# Patient Record
Sex: MALE | Race: WHITE | NOT HISPANIC OR LATINO | URBAN - METROPOLITAN AREA
[De-identification: names, ages, dates, MRNs, and addresses within clinical notes are randomized per-mention and may not be internally consistent; named-entity substitution may affect disease eponyms.]

---

## 2018-04-04 ENCOUNTER — EVALUATION (OUTPATIENT)
Dept: PHYSICAL THERAPY | Facility: CLINIC | Age: 58
End: 2018-04-04
Payer: COMMERCIAL

## 2018-04-04 DIAGNOSIS — M25.511 ACUTE PAIN OF RIGHT SHOULDER: Primary | ICD-10-CM

## 2018-04-04 PROCEDURE — G8984 CARRY CURRENT STATUS: HCPCS | Performed by: PHYSICAL THERAPIST

## 2018-04-04 PROCEDURE — G8985 CARRY GOAL STATUS: HCPCS | Performed by: PHYSICAL THERAPIST

## 2018-04-04 PROCEDURE — 97161 PT EVAL LOW COMPLEX 20 MIN: CPT | Performed by: PHYSICAL THERAPIST

## 2018-04-04 NOTE — PROGRESS NOTES
PT Evaluation     Today's date: 2018  Patient name: Theresa Corirgan  :   MRN: [id number]  Referring provider: Wilder Alvarez MD  Dx:   Encounter Diagnosis     ICD-10-CM    1  Acute pain of right shoulder M25 511        Start Time: 940  Stop Time:   Total time in clinic (min): 50 minutes    Assessment  Impairments: abnormal coordination, abnormal or restricted ROM, activity intolerance, lacks appropriate home exercise program and pain with function    Assessment details: Pt presents w/ indicators of R shldr complex malalignment w/ mod serratus anterior weakness, winging of scapula, painful arc into OH positions, and ttp within bicipital groove and near supraspinatus insertion  Also ttp noted within teres musculature  ROM is near [facility] however PN at end range and painful arc noted  Mild impingement is suggested w/ + cross over sign and palpation  Skilled PT is advised to address limitations and promote optimal activity potential and posturing PN and restriction free  Understanding of Dx/Px/POC: good   Prognosis: good    Goals  ST  Decrease PN <3/10 w/ all activity within 2 wks  2  Increase strength R shldr + 1 grade within 2 wks  3  Improve posturing and periscapular muscle activation within 2 wks  LT  I in HEP within 5 wks  2  Strength 5/5 t/o R shldr within 5 wks  3  ROM painfree in all planes WNL's within 5 wks  4   Return to all functional activity PN free within 5 wks    Plan  Patient would benefit from: skilled PT  Planned modality interventions: TENS, ultrasound and cryotherapy  Planned therapy interventions: manual therapy, joint mobilization, postural training, patient education, therapeutic exercise, strengthening, home exercise program and functional ROM exercises  Frequency: 1x week  Duration in visits: 6  Duration in weeks: 5  Treatment plan discussed with: patient        Subjective Evaluation    History of Present Illness  Mechanism of injury: Onset of R shldr PN since fall onto R side   PN gradually improved however sx's have leveled off and aggravated w/ repetitive OH reaching  Lifting firewood and recreational activity such as pickle ball aggravates sx's  Not a recurrent problem   Quality of life: good    Pain  Current pain ratin  At best pain ratin  At worst pain ratin  Quality: dull ache, sharp and tight  Relieving factors: medications  Aggravating factors: overhead activity and lifting  Progression: improved      Diagnostic Tests  X-ray: normal  Patient Goals  Patient goals for therapy: increased strength, decreased pain, increased motion and return to sport/leisure activities          Objective     Active Range of Motion     Right Shoulder   Flexion: 174 degrees with pain  Abduction: 172 degrees with pain  External rotation 0°: [facility]  Internal rotation 0°: [facility]    Additional Active Range of Motion Details  Painful arc  deg flex/abd motion, mild PN end range  Passive Range of Motion     Right Shoulder   Normal passive range of motion    Scapular Mobility     Right Shoulder   Scapular mobility: fair  Scapular Mobility with Shoulder to 90° FF   Scapular winging: moderate  Scapular elevation: minimal    Scapular Mobility beyond 90° FF   Scapular winging: minimal  Scapular elevation: minimal    Strength/Myotome Testing     Right Shoulder     Planes of Motion   Flexion: 4-   Extension: 4+   Abduction: 4-   Adduction: 4+   External rotation at 0°: 3+   Internal rotation at 0°: 5     Isolated Muscles   Subscapularis: 3     Tests     Right Shoulder   Positive crossover and painful arc  Negative clunk, empty can, Hawkin's and Neer's         Flowsheet Rows      Most Recent Value   PT/OT G-Codes   Current Score  67   Projected Score  76   FOTO information reviewed  Yes   Assessment Type  Evaluation   G code set  Carrying, Moving & Handling Objects   Carrying, Moving and Handling Objects Current Status ()  CJ   Carrying, Moving and Handling Objects Goal Status ()  CJ          Precautions: NKA       Daily Treatment Diary     Manual                                                                                   Exercise Diary                                                                                                                                                                                                                                                                                      Modalities

## 2018-04-04 NOTE — LETTER
May 14, 2018    MD Yaya Estevez [phone]    Patient: Sean Younger   YOB: 1960   Date of Visit: 2018     Encounter Diagnosis     ICD-10-CM    1  Acute pain of right shoulder M25 511        Dear Dr Rosamaria Forbes:    Please review the attached Plan of Care from Alexandru Montes's recent visit  Please verify that you agree therapy should continue by signing the attached document and sending it back to our office  If you have any questions or concerns, please don't hesitate to call  Sincerely,    Amy Chicas PT      Referring Provider:      I certify that I have read the below Plan of Care and certify the need for these services furnished under this plan of treatment while under my care  MD Yaya Estevez [phone]  VIA Facsimile: [phone]          PT Evaluation     Today's date: 2018  Patient name: Sean Younger  : [birth year]  MRN: [id number]  Referring provider: Marcela Anguiano MD  Dx:   Encounter Diagnosis     ICD-10-CM    1  Acute pain of right shoulder M25 511        Start Time: 40  Stop Time: 1030  Total time in clinic (min): 50 minutes    Assessment  Impairments: abnormal coordination, abnormal or restricted ROM, activity intolerance, lacks appropriate home exercise program and pain with function    Assessment details: Pt presents w/ indicators of R shldr complex malalignment w/ mod serratus anterior weakness, winging of scapula, painful arc into OH positions, and ttp within bicipital groove and near supraspinatus insertion  Also ttp noted within teres musculature  ROM is near [facility] however PN at end range and painful arc noted  Mild impingement is suggested w/ + cross over sign and palpation  Skilled PT is advised to address limitations and promote optimal activity potential and posturing PN and restriction free  Understanding of Dx/Px/POC: good   Prognosis: good    Goals  ST   Decrease PN <3/10 w/ all activity within 2 wks  2  Increase strength R shldr + 1 grade within 2 wks  3  Improve posturing and periscapular muscle activation within 2 wks  LT  I in HEP within 5 wks  2  Strength 5/5 t/o R shldr within 5 wks  3  ROM painfree in all planes WNL's within 5 wks  4  Return to all functional activity PN free within 5 wks    Plan  Patient would benefit from: skilled PT  Planned modality interventions: TENS, ultrasound and cryotherapy  Planned therapy interventions: manual therapy, joint mobilization, postural training, patient education, therapeutic exercise, strengthening, home exercise program and functional ROM exercises  Frequency: 1x week  Duration in visits: 6  Duration in weeks: 5  Treatment plan discussed with: patient        Subjective Evaluation    History of Present Illness  Mechanism of injury: Onset of R shldr PN since fall onto R side   PN gradually improved however sx's have leveled off and aggravated w/ repetitive OH reaching  Lifting firewood and recreational activity such as pickle ball aggravates sx's  Not a recurrent problem   Quality of life: good    Pain  Current pain ratin  At best pain ratin  At worst pain ratin  Quality: dull ache, sharp and tight  Relieving factors: medications  Aggravating factors: overhead activity and lifting  Progression: improved      Diagnostic Tests  X-ray: normal  Patient Goals  Patient goals for therapy: increased strength, decreased pain, increased motion and return to sport/leisure activities          Objective     Active Range of Motion     Right Shoulder   Flexion: 174 degrees with pain  Abduction: 172 degrees with pain  External rotation 0°:  [facility]  Internal rotation 0°:  [facility]    Additional Active Range of Motion Details  Painful arc  deg flex/abd motion, mild PN end range       Passive Range of Motion     Right Shoulder   Normal passive range of motion    Scapular Mobility     Right Shoulder   Scapular mobility: fair  Scapular Mobility with Shoulder to 90° FF   Scapular winging: moderate  Scapular elevation: minimal    Scapular Mobility beyond 90° FF   Scapular winging: minimal  Scapular elevation: minimal    Strength/Myotome Testing     Right Shoulder     Planes of Motion   Flexion: 4-   Extension: 4+   Abduction: 4-   Adduction: 4+   External rotation at 0°:  3+   Internal rotation at 0°:  5     Isolated Muscles   Subscapularis: 3     Tests     Right Shoulder   Positive crossover and painful arc  Negative clunk, empty can, Hawkin's and Neer's  Flowsheet Rows      Most Recent Value   PT/OT G-Codes   Current Score  67   Projected Score  76   FOTO information reviewed  Yes   Assessment Type  Evaluation   G code set  Carrying, Moving & Handling Objects   Carrying, Moving and Handling Objects Current Status ()  CJ   Carrying, Moving and Handling Objects Goal Status ()  CJ          Precautions: NKA       Daily Treatment Diary     Manual                                                                                   Exercise Diary                                                                                                                                                                                                                                                                                      Modalities

## 2018-04-17 ENCOUNTER — OFFICE VISIT (OUTPATIENT)
Dept: PHYSICAL THERAPY | Facility: CLINIC | Age: 58
End: 2018-04-17
Payer: COMMERCIAL

## 2018-04-17 DIAGNOSIS — M25.511 ACUTE PAIN OF RIGHT SHOULDER: Primary | ICD-10-CM

## 2018-04-17 PROCEDURE — 97140 MANUAL THERAPY 1/> REGIONS: CPT | Performed by: PHYSICAL THERAPIST

## 2018-04-17 PROCEDURE — 97110 THERAPEUTIC EXERCISES: CPT | Performed by: PHYSICAL THERAPIST

## 2018-04-17 NOTE — PROGRESS NOTES
Daily Note     Today's date: 2018  Patient name: Paige Valle  :   MRN: [id number]  Referring provider: Jesus Hernandez, PT  Dx:   Encounter Diagnosis     ICD-10-CM    1  Acute pain of right shoulder M25 511                   Subjective: Initially some soreness in shldr w/ serratus punches at home but less PN now and able to advance to 25 reps  Objective: See treatment diary below      Assessment: Tolerated treatment well  Patient would benefit from continued PT  Good response to TE progressions w/ mild fatigue admitted, better PROM OH when manual GH distraction force applied, suggesting cont'd inflammation within ant shldr        Plan: Continue per plan of care  Precautions: NKA       Daily Treatment Diary     Manual              PROM R shldr + GH distraction [facility]            Gr2-3 mobs [facility]                                       X12'                Exercise Diary              UBE: FWD/REV x2'e            Pulleys x3'            Isometrics: flex/abd, IR/ER 3s x20e            Supine serratus punch 2# x20            Wand shldr flex x10            Wand shldr abd x10            Prone: shldr scaption x10            Prone: row x10            Prone: shldr ext x10                                                                                                                                                               Modalities

## 2018-04-26 ENCOUNTER — OFFICE VISIT (OUTPATIENT)
Dept: PHYSICAL THERAPY | Facility: CLINIC | Age: 58
End: 2018-04-26
Payer: COMMERCIAL

## 2018-04-26 DIAGNOSIS — M25.511 ACUTE PAIN OF RIGHT SHOULDER: Primary | ICD-10-CM

## 2018-04-26 PROCEDURE — 97140 MANUAL THERAPY 1/> REGIONS: CPT | Performed by: PHYSICAL THERAPIST

## 2018-04-26 PROCEDURE — 97110 THERAPEUTIC EXERCISES: CPT | Performed by: PHYSICAL THERAPIST

## 2018-04-26 NOTE — PROGRESS NOTES
Daily Note     Today's date: 2018  Patient name: Corinna Beckford  :   MRN: [id number]  Referring provider: Mulugeta Vides, PT  Dx:   Encounter Diagnosis     ICD-10-CM    1  Acute pain of right shoulder M25 511                   Subjective: Pt reports a soreness that develop w/ repetitive OH reaching exercise  Objective: See treatment diary below      Assessment: Tolerated treatment well  Patient would benefit from continued PT  Able to advance resistance training this day to include increased reps w/ isometrics, also addition of TB resistance and weighted OH flexion and abd w/ pt citing some fatigue and soreness, but reduced by end of session post manuals  Plan: Continue per plan of care  Precautions: NKA       Daily Treatment Diary     Manual             PROM R shldr + GH distraction [facility]           Gr2-3 mobs [facility]                                      X12' X12'               Exercise Diary             UBE: FWD/REV x2'e x3'e           Pulleys x3' x4'           Isometrics: flex/abd, IR/ER 3s x20e 3s x30e           Supine serratus punch 2# x20 2# x30           Wand shldr flex x10            Wand shldr abd x10            Prone: shldr scaption x10 x20           Prone: row x10 x20           Prone: shldr ext x10 x20           TB shldr extension  PurSM9h x20           TB scap retraction  RubVR4d x20           AROM flex  1# x15           AROM scaption  1# x15                                                                                                          Modalities

## 2018-05-14 ENCOUNTER — TRANSCRIBE ORDERS (OUTPATIENT)
Dept: PHYSICAL THERAPY | Facility: CLINIC | Age: 58
End: 2018-05-14

## 2018-05-14 DIAGNOSIS — M25.511 ACUTE PAIN OF RIGHT SHOULDER: Primary | ICD-10-CM

## 2018-05-15 ENCOUNTER — APPOINTMENT (OUTPATIENT)
Dept: PHYSICAL THERAPY | Facility: CLINIC | Age: 58
End: 2018-05-15
Payer: COMMERCIAL

## 2018-05-22 ENCOUNTER — OFFICE VISIT (OUTPATIENT)
Dept: PHYSICAL THERAPY | Facility: CLINIC | Age: 58
End: 2018-05-22
Payer: COMMERCIAL

## 2018-05-22 DIAGNOSIS — M25.511 ACUTE PAIN OF RIGHT SHOULDER: Primary | ICD-10-CM

## 2018-05-22 PROCEDURE — 97110 THERAPEUTIC EXERCISES: CPT | Performed by: PHYSICAL THERAPIST

## 2018-05-22 PROCEDURE — G8985 CARRY GOAL STATUS: HCPCS | Performed by: PHYSICAL THERAPIST

## 2018-05-22 PROCEDURE — 97140 MANUAL THERAPY 1/> REGIONS: CPT | Performed by: PHYSICAL THERAPIST

## 2018-05-22 PROCEDURE — G8984 CARRY CURRENT STATUS: HCPCS | Performed by: PHYSICAL THERAPIST

## 2018-05-22 NOTE — PROGRESS NOTES
Daily Note + PT Discharge    Today's date: 2018  Patient name: Margaret Borges  : [id number]  MRN: [id number]  Referring provider: Nga Jorge MD  Dx:   Encounter Diagnosis     ICD-10-CM    1  Acute pain of right shoulder M25 511                   Subjective: Overall improvement including better noreen to sleep and greater activity tolerance  Notices irritation w/ raking and yardwork prolonged, also when a "burn' from muscle fatigue when lifting cans OH for OH flexion exercises  Objective: See treatment diary below      Assessment: Tolerated treatment well  Patient would benefit from continued PT  Advanced strengthening w/ good response but mod fatigue, added IR/ER stretches w/ good response  Updated HEP, pt to f/u once returns from [place]  *Pt did not f/u once returned from [facility], now d/c skilled PT  Plan: D/c skilled PT  Precautions: NKA       Daily Treatment Diary     Manual            PROM R shldr + GH distraction [facility]          Gr2-3 mobs [facility]                                     X13' X12' X12'              Exercise Diary            UBE: FWD/REV x2'e x3'e x3'e          Pulleys x3' x4' x4'          Isometrics: flex/abd, IR/ER 3s x20e 3s x30e           Supine serratus punch 2# x20 2# x30 3# x30          Wand shldr flex x10            Wand shldr abd x10            Prone: shldr scaption x10 x20           Prone: row x10 x20           Prone: shldr ext x10 x20           TB shldr extension  [id number] x20 Blue TB x20          TB scap retraction  [id number] x20 Blue TB x20          AROM flex  1# x15 2# x10          AROM scaption  1# x15 2# x10          Seated cross arm str   15s x5          Ball @ wall CW/CCW   3x10e          Doorway str   15s x5                                                                  Modalities

## 2020-10-21 ENCOUNTER — OFFICE VISIT (OUTPATIENT)
Dept: FAMILY MEDICINE CLINIC | Facility: CLINIC | Age: 60
End: 2020-10-21
Payer: COMMERCIAL

## 2020-10-21 VITALS
SYSTOLIC BLOOD PRESSURE: 126 MMHG | OXYGEN SATURATION: 97 % | HEIGHT: 66 IN | HEART RATE: 80 BPM | WEIGHT: 169.6 LBS | TEMPERATURE: 97.8 F | DIASTOLIC BLOOD PRESSURE: 84 MMHG | BODY MASS INDEX: 27.26 KG/M2

## 2020-10-21 DIAGNOSIS — Z13.1 SCREENING FOR DIABETES MELLITUS: ICD-10-CM

## 2020-10-21 DIAGNOSIS — I10 HYPERTENSION, UNSPECIFIED TYPE: Primary | ICD-10-CM

## 2020-10-21 DIAGNOSIS — Z12.11 ENCOUNTER FOR SCREENING COLONOSCOPY: ICD-10-CM

## 2020-10-21 DIAGNOSIS — M25.50 ARTHRALGIA, UNSPECIFIED JOINT: ICD-10-CM

## 2020-10-21 DIAGNOSIS — Z13.29 SCREENING FOR THYROID DISORDER: ICD-10-CM

## 2020-10-21 DIAGNOSIS — E78.5 HYPERLIPIDEMIA, UNSPECIFIED HYPERLIPIDEMIA TYPE: ICD-10-CM

## 2020-10-21 DIAGNOSIS — Z76.89 ENCOUNTER TO ESTABLISH CARE: ICD-10-CM

## 2020-10-21 DIAGNOSIS — Z13.0 SCREENING FOR DEFICIENCY ANEMIA: ICD-10-CM

## 2020-10-21 PROCEDURE — 99203 OFFICE O/P NEW LOW 30 MIN: CPT | Performed by: NURSE PRACTITIONER

## 2020-10-21 PROCEDURE — 1036F TOBACCO NON-USER: CPT | Performed by: NURSE PRACTITIONER

## 2020-10-21 PROCEDURE — 3079F DIAST BP 80-89 MM HG: CPT | Performed by: NURSE PRACTITIONER

## 2020-10-21 PROCEDURE — 3725F SCREEN DEPRESSION PERFORMED: CPT | Performed by: NURSE PRACTITIONER

## 2020-10-21 PROCEDURE — 3074F SYST BP LT 130 MM HG: CPT | Performed by: NURSE PRACTITIONER

## 2020-10-21 RX ORDER — SIMVASTATIN 20 MG
20 TABLET ORAL DAILY
COMMUNITY
Start: 2020-08-26 | End: 2020-12-22 | Stop reason: SDUPTHER

## 2020-10-21 RX ORDER — LISINOPRIL 20 MG/1
20 TABLET ORAL DAILY
COMMUNITY
Start: 2020-08-26 | End: 2020-12-22 | Stop reason: SDUPTHER

## 2020-10-22 ENCOUNTER — TELEPHONE (OUTPATIENT)
Dept: GASTROENTEROLOGY | Facility: AMBULARY SURGERY CENTER | Age: 60
End: 2020-10-22

## 2020-11-04 ENCOUNTER — TELEPHONE (OUTPATIENT)
Dept: FAMILY MEDICINE CLINIC | Facility: CLINIC | Age: 60
End: 2020-11-04

## 2020-12-02 DIAGNOSIS — I10 HYPERTENSION, UNSPECIFIED TYPE: ICD-10-CM

## 2020-12-02 DIAGNOSIS — E78.5 HYPERLIPIDEMIA, UNSPECIFIED HYPERLIPIDEMIA TYPE: Primary | ICD-10-CM

## 2020-12-02 LAB
ALBUMIN SERPL-MCNC: 4.2 G/DL (ref 3.6–5.1)
ALBUMIN/GLOB SERPL: 1.6 (CALC) (ref 1–2.5)
ALP SERPL-CCNC: 70 U/L (ref 35–144)
ALT SERPL-CCNC: 53 U/L (ref 9–46)
AST SERPL-CCNC: 32 U/L (ref 10–35)
B BURGDOR AB SER IA-ACNC: <0.9 INDEX
BASOPHILS # BLD AUTO: 41 CELLS/UL (ref 0–200)
BASOPHILS NFR BLD AUTO: 0.9 %
BILIRUB SERPL-MCNC: 0.7 MG/DL (ref 0.2–1.2)
BUN SERPL-MCNC: 21 MG/DL (ref 7–25)
BUN/CREAT SERPL: ABNORMAL (CALC) (ref 6–22)
CALCIUM SERPL-MCNC: 9.3 MG/DL (ref 8.6–10.3)
CHLORIDE SERPL-SCNC: 102 MMOL/L (ref 98–110)
CHOLEST SERPL-MCNC: 196 MG/DL
CHOLEST/HDLC SERPL: 5 (CALC)
CO2 SERPL-SCNC: 28 MMOL/L (ref 20–32)
CREAT SERPL-MCNC: 1.22 MG/DL (ref 0.7–1.25)
EOSINOPHIL # BLD AUTO: 60 CELLS/UL (ref 15–500)
EOSINOPHIL NFR BLD AUTO: 1.3 %
ERYTHROCYTE [DISTWIDTH] IN BLOOD BY AUTOMATED COUNT: 12.7 % (ref 11–15)
EST. AVERAGE GLUCOSE BLD GHB EST-MCNC: 114 (CALC)
EST. AVERAGE GLUCOSE BLD GHB EST-SCNC: 6.3 (CALC)
GLOBULIN SER CALC-MCNC: 2.7 G/DL (CALC) (ref 1.9–3.7)
GLUCOSE SERPL-MCNC: 120 MG/DL (ref 65–99)
HBA1C MFR BLD: 5.6 % OF TOTAL HGB
HCT VFR BLD AUTO: 48 % (ref 38.5–50)
HDLC SERPL-MCNC: 39 MG/DL
HGB BLD-MCNC: 15.9 G/DL (ref 13.2–17.1)
LDLC SERPL CALC-MCNC: 123 MG/DL (CALC)
LYMPHOCYTES # BLD AUTO: 2024 CELLS/UL (ref 850–3900)
LYMPHOCYTES NFR BLD AUTO: 44 %
MCH RBC QN AUTO: 31.2 PG (ref 27–33)
MCHC RBC AUTO-ENTMCNC: 33.1 G/DL (ref 32–36)
MCV RBC AUTO: 94.3 FL (ref 80–100)
MONOCYTES # BLD AUTO: 442 CELLS/UL (ref 200–950)
MONOCYTES NFR BLD AUTO: 9.6 %
NEUTROPHILS # BLD AUTO: 2033 CELLS/UL (ref 1500–7800)
NEUTROPHILS NFR BLD AUTO: 44.2 %
NONHDLC SERPL-MCNC: 157 MG/DL (CALC)
PLATELET # BLD AUTO: 181 THOUSAND/UL (ref 140–400)
PMV BLD REES-ECKER: 10.6 FL (ref 7.5–12.5)
POTASSIUM SERPL-SCNC: 4.3 MMOL/L (ref 3.5–5.3)
PROT SERPL-MCNC: 6.9 G/DL (ref 6.1–8.1)
RBC # BLD AUTO: 5.09 MILLION/UL (ref 4.2–5.8)
SL AMB EGFR AFRICAN AMERICAN: 74 ML/MIN/1.73M2
SL AMB EGFR NON AFRICAN AMERICAN: 64 ML/MIN/1.73M2
SODIUM SERPL-SCNC: 139 MMOL/L (ref 135–146)
TRIGL SERPL-MCNC: 222 MG/DL
TSH SERPL-ACNC: 2.93 MIU/L (ref 0.4–4.5)
WBC # BLD AUTO: 4.6 THOUSAND/UL (ref 3.8–10.8)

## 2020-12-22 DIAGNOSIS — E78.5 HYPERLIPIDEMIA, UNSPECIFIED HYPERLIPIDEMIA TYPE: Primary | ICD-10-CM

## 2020-12-22 DIAGNOSIS — I10 HYPERTENSION, UNSPECIFIED TYPE: ICD-10-CM

## 2020-12-23 RX ORDER — SIMVASTATIN 20 MG
20 TABLET ORAL DAILY
Qty: 90 TABLET | Refills: 1 | Status: SHIPPED | OUTPATIENT
Start: 2020-12-23 | End: 2021-06-22

## 2020-12-23 RX ORDER — LISINOPRIL 20 MG/1
20 TABLET ORAL DAILY
Qty: 90 TABLET | Refills: 1 | Status: SHIPPED | OUTPATIENT
Start: 2020-12-23 | End: 2021-06-22

## 2021-04-22 ENCOUNTER — IMMUNIZATIONS (OUTPATIENT)
Dept: FAMILY MEDICINE CLINIC | Facility: HOSPITAL | Age: 61
End: 2021-04-22

## 2021-04-22 DIAGNOSIS — Z23 ENCOUNTER FOR IMMUNIZATION: Primary | ICD-10-CM

## 2021-04-22 PROCEDURE — 91301 SARS-COV-2 / COVID-19 MRNA VACCINE (MODERNA) 100 MCG: CPT

## 2021-04-22 PROCEDURE — 0012A SARS-COV-2 / COVID-19 MRNA VACCINE (MODERNA) 100 MCG: CPT

## 2024-09-30 ENCOUNTER — TELEPHONE (OUTPATIENT)
Dept: FAMILY MEDICINE CLINIC | Facility: CLINIC | Age: 64
End: 2024-09-30

## 2024-09-30 NOTE — TELEPHONE ENCOUNTER
Patient Attribution #1  Lvm for patient to call and confirm if he is still a patient with the practice and if he would like to schedule an appointment for a physical.

## 2025-08-20 ENCOUNTER — EVALUATION (OUTPATIENT)
Dept: PHYSICAL THERAPY | Facility: CLINIC | Age: 65
End: 2025-08-20
Payer: MEDICARE

## 2025-08-20 DIAGNOSIS — M25.511 CHRONIC RIGHT SHOULDER PAIN: Primary | ICD-10-CM

## 2025-08-20 DIAGNOSIS — G89.29 CHRONIC RIGHT SHOULDER PAIN: Primary | ICD-10-CM

## 2025-08-20 PROCEDURE — 97110 THERAPEUTIC EXERCISES: CPT | Performed by: PHYSICAL THERAPIST

## 2025-08-20 PROCEDURE — 97161 PT EVAL LOW COMPLEX 20 MIN: CPT | Performed by: PHYSICAL THERAPIST

## 2025-08-22 ENCOUNTER — OFFICE VISIT (OUTPATIENT)
Dept: PHYSICAL THERAPY | Facility: CLINIC | Age: 65
End: 2025-08-22
Payer: MEDICARE

## 2025-08-22 DIAGNOSIS — G89.29 CHRONIC RIGHT SHOULDER PAIN: Primary | ICD-10-CM

## 2025-08-22 DIAGNOSIS — M25.511 CHRONIC RIGHT SHOULDER PAIN: Primary | ICD-10-CM

## 2025-08-22 PROCEDURE — 97112 NEUROMUSCULAR REEDUCATION: CPT | Performed by: PHYSICAL THERAPIST

## 2025-08-22 PROCEDURE — 97110 THERAPEUTIC EXERCISES: CPT | Performed by: PHYSICAL THERAPIST
